# Patient Record
Sex: FEMALE | Race: WHITE | NOT HISPANIC OR LATINO
[De-identification: names, ages, dates, MRNs, and addresses within clinical notes are randomized per-mention and may not be internally consistent; named-entity substitution may affect disease eponyms.]

---

## 2018-05-01 ENCOUNTER — RESULT REVIEW (OUTPATIENT)
Age: 53
End: 2018-05-01

## 2019-04-24 ENCOUNTER — RESULT REVIEW (OUTPATIENT)
Age: 54
End: 2019-04-24

## 2020-06-23 ENCOUNTER — RESULT REVIEW (OUTPATIENT)
Age: 55
End: 2020-06-23

## 2021-01-24 PROBLEM — Z00.00 ENCOUNTER FOR PREVENTIVE HEALTH EXAMINATION: Status: ACTIVE | Noted: 2021-01-24

## 2021-01-27 ENCOUNTER — APPOINTMENT (OUTPATIENT)
Dept: BREAST CENTER | Facility: CLINIC | Age: 56
End: 2021-01-27
Payer: COMMERCIAL

## 2021-01-27 VITALS
HEART RATE: 75 BPM | SYSTOLIC BLOOD PRESSURE: 125 MMHG | BODY MASS INDEX: 31.24 KG/M2 | HEIGHT: 64 IN | WEIGHT: 183 LBS | DIASTOLIC BLOOD PRESSURE: 80 MMHG

## 2021-01-27 DIAGNOSIS — Z86.000 PERSONAL HISTORY OF IN-SITU NEOPLASM OF BREAST: ICD-10-CM

## 2021-01-27 DIAGNOSIS — Z85.3 PERSONAL HISTORY OF MALIGNANT NEOPLASM OF BREAST: ICD-10-CM

## 2021-01-27 PROCEDURE — 99213 OFFICE O/P EST LOW 20 MIN: CPT

## 2021-01-27 PROCEDURE — 99072 ADDL SUPL MATRL&STAF TM PHE: CPT

## 2021-01-27 NOTE — PHYSICAL EXAM
[Normocephalic] : normocephalic [Atraumatic] : atraumatic [EOMI] : extra ocular movement intact [Supple] : supple [No Supraclavicular Adenopathy] : no supraclavicular adenopathy [No Cervical Adenopathy] : no cervical adenopathy [Normal Sinus Rhythm] : normal sinus rhythm [Examined in the supine and seated position] : examined in the supine and seated position [No dominant masses] : no dominant masses in right breast  [No dominant masses] : no dominant masses left breast [No Nipple Retraction] : no left nipple retraction [No Nipple Discharge] : no left nipple discharge [Breast Mass Right Breast ___cm] : no masses [Breast Mass Left Breast ___cm] : no masses [No Axillary Lymphadenopathy] : no left axillary lymphadenopathy [Soft] : abdomen soft [Normal Bowel Sounds] : normal bowel sounds  [No Rashes] : no rashes [Breast Nipple Inversion] : nipples not inverted [Breast Nipple Retraction] : nipples not retracted [Breast Nipple Flattening] : nipples not flattened [Breast Nipple Fissures] : nipples not fissured [de-identified] : On exam, she has a well-healed incision from her right breast wide excision and bilateral mastopexy's.  She has an excellent cosmetic result.  I cannot feel any evidence of recurrence he does have some fat necrosis felt on the lateral aspect of the right breast after her mastopexy's.  She has no suspicious densities in either breast.  She has no axillary, supraclavicular, or cervical adenopathy. [de-identified] : That is post right breast wide excision and reduction mastopexy with palpable lateral fat necrosis [de-identified] : Status post reduction mastopexy

## 2021-01-27 NOTE — HISTORY OF PRESENT ILLNESS
[FreeTextEntry1] : The patient is a 55-year-old G3, P2 perimenopausal white female of [place] descent.  She underwent menarche at age 17 and had her first child at age 34.  She has no family history of breast or ovarian cancer.  Her mother passed away from lung cancer at age 65 and was a smoker.  The patient was getting routine mammography and ultrasound and was found to have some suspicious calcifications in the right breast lower outer quadrant on screening mammography on June 4, 2018.  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  She underwent an MRI at [facility] on July 6, 2018 showing biopsy changes in the lower outer aspect of the right breast but there was also an indeterminate mass in the upper outer aspect of the right breast and non-mass-like enhancement in the upper outer aspect of the left breast for which MRI core biopsies were being recommended bilaterally.  I had Dr. Lawson, review the films and had her repeat the MRI for possible MRI guided biopsies of the separate areas in the right and left upper outer quadrants but these were felt to be benign.  The patient was seen by Dr. Syed preoperatively and I went ahead and performed a right breast partial mastectomy with needle localization with bilateral reduction mastopexy approaches on August 20, 2018.  The final pathology came back with low to intermediate grade DCIS with widely free margins on the right side with the closest margin being 2 cm.  The DCIS remained ER/NE strongly positive.  Pathology on the left side did show some atypical ductal hyperplasia.  The patient was sent for radiation oncology evaluation and Dr. Huang sent an Oncotype for DCIS which had a recurrence score of 0 so no radiation was recommended.  She started tamoxifen around October 2018 but had some weight gain and saw Dr. Fournier and was decided that she could come off of it.  She comes in for routine follow-up and continues to get yearly mammography and ultrasound.

## 2021-01-27 NOTE — REASON FOR VISIT
[Follow-Up: _____] : a [unfilled] follow-up visit [FreeTextEntry1] : Patient comes in with a history of a right breast DCIS for which she underwent a right breast partial mastectomy with bilateral reduction mastopexy approach on August 20, 2018 performed with Dr. Syed.  Final pathology just showed low to intermediate grade DCIS with widely free margins which was ER/KS strongly positive.  She was found to have some atypical ductal hyperplasia on the left side.  She had an Oncotype DCIS recurrence score of 0 and did not receive any radiation.  She started tamoxifen 2018 but came off of it.

## 2021-01-27 NOTE — ASSESSMENT
[FreeTextEntry1] : The patient is a 55-year-old G3, P2 perimenopausal white female of [place] descent.  She underwent menarche at age 17 and had her first child at age 34.  She has no family history of breast or ovarian cancer.  Her mother passed away from lung cancer at age 65 and was a smoker.  The patient was getting routine mammography and ultrasound and was found to have some suspicious calcifications in the right breast lower outer quadrant on screening mammography on June 4, 2018.  Stereotactic biopsy was performed at [facility] on June 28, 2018 showing intermediate grade DCIS which was ER/WV strongly positive at 99%.  She underwent an MRI at [facility] on July 6, 2018 showing biopsy changes in the lower outer aspect of the right breast but there was also an indeterminate mass in the upper outer aspect of the right breast and non-mass-like enhancement in the upper outer aspect of the left breast for which MRI core biopsies were being recommended bilaterally.  I had Dr. Lawson, review the films and had her repeat the MRI for possible MRI guided biopsies of the separate areas in the right and left upper outer quadrants but these were felt to be benign.  The patient was seen by Dr. Syed preoperatively and I went ahead and performed a right breast partial mastectomy with needle localization with bilateral reduction mastopexy approaches on August 20, 2018.  The final pathology came back with low to intermediate grade DCIS with widely free margins on the right side with the closest margin being 2 cm.  The DCIS remained ER/WV strongly positive.  Pathology on the left side did show some atypical ductal hyperplasia.  The patient was sent for radiation oncology evaluation and Dr. Huang sent an Oncotype for DCIS which had a recurrence score of 0 so no radiation was recommended.  She started tamoxifen around October 2018 but had some weight gain and saw Dr. Fournier and was decided that she could come off of it.  On exam, she has no evidence of recurrence but does have some palpable fat necrosis on the lateral aspect of the right breast.  Her last bilateral mammography and ultrasound on Ivanna 10, 2020 showed some bilateral postoperative changes.  I would like to see her again in 6 months and she should be due for her bilateral mammography and ultrasound at that time around June 2021.

## 2021-01-28 ENCOUNTER — NON-APPOINTMENT (OUTPATIENT)
Age: 56
End: 2021-01-28

## 2021-01-28 DIAGNOSIS — Z80.1 FAMILY HISTORY OF MALIGNANT NEOPLASM OF TRACHEA, BRONCHUS AND LUNG: ICD-10-CM

## 2021-01-28 DIAGNOSIS — Z87.891 PERSONAL HISTORY OF NICOTINE DEPENDENCE: ICD-10-CM

## 2021-01-28 DIAGNOSIS — Z78.9 OTHER SPECIFIED HEALTH STATUS: ICD-10-CM

## 2021-01-28 DIAGNOSIS — K21.9 GASTRO-ESOPHAGEAL REFLUX DISEASE W/OUT ESOPHAGITIS: ICD-10-CM

## 2021-01-28 DIAGNOSIS — R92.0 MAMMOGRAPHIC MICROCALCIFICATION FOUND ON DIAGNOSTIC IMAGING OF BREAST: ICD-10-CM

## 2021-01-28 DIAGNOSIS — Z87.19 PERSONAL HISTORY OF OTHER DISEASES OF THE DIGESTIVE SYSTEM: ICD-10-CM

## 2021-01-28 RX ORDER — CEFADROXIL 500 MG/1
500 CAPSULE ORAL
Refills: 0 | Status: ACTIVE | COMMUNITY

## 2021-01-28 RX ORDER — ACETAMINOPHEN AND CODEINE PHOSPHATE 300; 30 MG/1; MG/1
TABLET ORAL
Refills: 0 | Status: ACTIVE | COMMUNITY

## 2021-01-28 RX ORDER — RANITIDINE 150 MG/1
150 TABLET ORAL
Refills: 0 | Status: ACTIVE | COMMUNITY

## 2021-01-28 RX ORDER — TAMOXIFEN CITRATE 20 MG/1
20 TABLET, FILM COATED ORAL
Refills: 0 | Status: ACTIVE | COMMUNITY

## 2021-01-28 RX ORDER — TAMOXIFEN CITRATE 10 MG/1
10 TABLET, FILM COATED ORAL
Refills: 0 | Status: ACTIVE | COMMUNITY

## 2021-01-28 RX ORDER — MULTIVITAMIN
TABLET ORAL
Refills: 0 | Status: ACTIVE | COMMUNITY

## 2021-07-13 ENCOUNTER — RESULT REVIEW (OUTPATIENT)
Age: 56
End: 2021-07-13

## 2022-02-06 ENCOUNTER — TRANSCRIPTION ENCOUNTER (OUTPATIENT)
Age: 57
End: 2022-02-06

## 2022-07-19 ENCOUNTER — RESULT REVIEW (OUTPATIENT)
Age: 57
End: 2022-07-19

## 2022-11-22 ENCOUNTER — NON-APPOINTMENT (OUTPATIENT)
Age: 57
End: 2022-11-22

## 2025-02-13 ENCOUNTER — NON-APPOINTMENT (OUTPATIENT)
Age: 60
End: 2025-02-13